# Patient Record
Sex: MALE | Race: BLACK OR AFRICAN AMERICAN | NOT HISPANIC OR LATINO | URBAN - METROPOLITAN AREA
[De-identification: names, ages, dates, MRNs, and addresses within clinical notes are randomized per-mention and may not be internally consistent; named-entity substitution may affect disease eponyms.]

---

## 2022-03-18 ENCOUNTER — HOSPITAL ENCOUNTER (EMERGENCY)
Facility: OTHER | Age: 32
Discharge: PSYCHIATRIC OR SUBSTANCE USE FACILITY | End: 2022-03-18
Attending: EMERGENCY MEDICINE
Payer: MEDICAID

## 2022-03-18 VITALS
RESPIRATION RATE: 17 BRPM | OXYGEN SATURATION: 100 % | HEART RATE: 68 BPM | DIASTOLIC BLOOD PRESSURE: 78 MMHG | SYSTOLIC BLOOD PRESSURE: 129 MMHG | WEIGHT: 125 LBS | TEMPERATURE: 98 F | HEIGHT: 68 IN | BODY MASS INDEX: 18.94 KG/M2

## 2022-03-18 DIAGNOSIS — F23 ACUTE PSYCHOSIS: Primary | ICD-10-CM

## 2022-03-18 LAB
ALBUMIN SERPL BCP-MCNC: 4.6 G/DL (ref 3.5–5.2)
ALP SERPL-CCNC: 86 U/L (ref 55–135)
ALT SERPL W/O P-5'-P-CCNC: 10 U/L (ref 10–44)
AMPHET+METHAMPHET UR QL: NEGATIVE
ANION GAP SERPL CALC-SCNC: 11 MMOL/L (ref 8–16)
APAP SERPL-MCNC: <3 UG/ML (ref 10–20)
AST SERPL-CCNC: 14 U/L (ref 10–40)
BARBITURATES UR QL SCN>200 NG/ML: NEGATIVE
BASOPHILS # BLD AUTO: 0.03 K/UL (ref 0–0.2)
BASOPHILS NFR BLD: 0.7 % (ref 0–1.9)
BENZODIAZ UR QL SCN>200 NG/ML: NEGATIVE
BILIRUB SERPL-MCNC: 0.4 MG/DL (ref 0.1–1)
BILIRUB UR QL STRIP: NEGATIVE
BUN SERPL-MCNC: 10 MG/DL (ref 6–20)
BZE UR QL SCN: NEGATIVE
CALCIUM SERPL-MCNC: 9.7 MG/DL (ref 8.7–10.5)
CANNABINOIDS UR QL SCN: NEGATIVE
CHLORIDE SERPL-SCNC: 104 MMOL/L (ref 95–110)
CLARITY UR: CLEAR
CO2 SERPL-SCNC: 25 MMOL/L (ref 23–29)
COLOR UR: YELLOW
CREAT SERPL-MCNC: 0.9 MG/DL (ref 0.5–1.4)
CREAT UR-MCNC: 34.1 MG/DL (ref 23–375)
CTP QC/QA: YES
DIFFERENTIAL METHOD: ABNORMAL
EOSINOPHIL # BLD AUTO: 0 K/UL (ref 0–0.5)
EOSINOPHIL NFR BLD: 0.7 % (ref 0–8)
ERYTHROCYTE [DISTWIDTH] IN BLOOD BY AUTOMATED COUNT: 13.8 % (ref 11.5–14.5)
EST. GFR  (AFRICAN AMERICAN): >60 ML/MIN/1.73 M^2
EST. GFR  (NON AFRICAN AMERICAN): >60 ML/MIN/1.73 M^2
ETHANOL SERPL-MCNC: <10 MG/DL
GLUCOSE SERPL-MCNC: 106 MG/DL (ref 70–110)
GLUCOSE UR QL STRIP: NEGATIVE
HCT VFR BLD AUTO: 38.8 % (ref 40–54)
HGB BLD-MCNC: 13 G/DL (ref 14–18)
HGB UR QL STRIP: NEGATIVE
IMM GRANULOCYTES # BLD AUTO: 0 K/UL (ref 0–0.04)
IMM GRANULOCYTES NFR BLD AUTO: 0 % (ref 0–0.5)
KETONES UR QL STRIP: NEGATIVE
LEUKOCYTE ESTERASE UR QL STRIP: NEGATIVE
LYMPHOCYTES # BLD AUTO: 1.5 K/UL (ref 1–4.8)
LYMPHOCYTES NFR BLD: 34.4 % (ref 18–48)
MCH RBC QN AUTO: 26.7 PG (ref 27–31)
MCHC RBC AUTO-ENTMCNC: 33.5 G/DL (ref 32–36)
MCV RBC AUTO: 80 FL (ref 82–98)
METHADONE UR QL SCN>300 NG/ML: NEGATIVE
MONOCYTES # BLD AUTO: 0.5 K/UL (ref 0.3–1)
MONOCYTES NFR BLD: 12 % (ref 4–15)
NEUTROPHILS # BLD AUTO: 2.3 K/UL (ref 1.8–7.7)
NEUTROPHILS NFR BLD: 52.2 % (ref 38–73)
NITRITE UR QL STRIP: NEGATIVE
NRBC BLD-RTO: 0 /100 WBC
OPIATES UR QL SCN: NEGATIVE
PCP UR QL SCN>25 NG/ML: NEGATIVE
PH UR STRIP: 7 [PH] (ref 5–8)
PLATELET # BLD AUTO: 168 K/UL (ref 150–450)
PMV BLD AUTO: 10 FL (ref 9.2–12.9)
POTASSIUM SERPL-SCNC: 3.7 MMOL/L (ref 3.5–5.1)
PROT SERPL-MCNC: 8.1 G/DL (ref 6–8.4)
PROT UR QL STRIP: NEGATIVE
RBC # BLD AUTO: 4.86 M/UL (ref 4.6–6.2)
SARS-COV-2 RDRP RESP QL NAA+PROBE: NEGATIVE
SODIUM SERPL-SCNC: 140 MMOL/L (ref 136–145)
SP GR UR STRIP: 1.01 (ref 1–1.03)
TOXICOLOGY INFORMATION: NORMAL
TSH SERPL DL<=0.005 MIU/L-ACNC: 1.27 UIU/ML (ref 0.4–4)
URN SPEC COLLECT METH UR: NORMAL
UROBILINOGEN UR STRIP-ACNC: NEGATIVE EU/DL
WBC # BLD AUTO: 4.42 K/UL (ref 3.9–12.7)

## 2022-03-18 PROCEDURE — U0002 COVID-19 LAB TEST NON-CDC: HCPCS | Performed by: EMERGENCY MEDICINE

## 2022-03-18 PROCEDURE — 82077 ASSAY SPEC XCP UR&BREATH IA: CPT | Performed by: EMERGENCY MEDICINE

## 2022-03-18 PROCEDURE — 80307 DRUG TEST PRSMV CHEM ANLYZR: CPT | Performed by: EMERGENCY MEDICINE

## 2022-03-18 PROCEDURE — 81003 URINALYSIS AUTO W/O SCOPE: CPT | Mod: 59 | Performed by: EMERGENCY MEDICINE

## 2022-03-18 PROCEDURE — 80053 COMPREHEN METABOLIC PANEL: CPT | Performed by: EMERGENCY MEDICINE

## 2022-03-18 PROCEDURE — 84443 ASSAY THYROID STIM HORMONE: CPT | Performed by: EMERGENCY MEDICINE

## 2022-03-18 PROCEDURE — 85025 COMPLETE CBC W/AUTO DIFF WBC: CPT | Performed by: EMERGENCY MEDICINE

## 2022-03-18 PROCEDURE — 99285 EMERGENCY DEPT VISIT HI MDM: CPT | Mod: 25

## 2022-03-18 PROCEDURE — 80143 DRUG ASSAY ACETAMINOPHEN: CPT | Performed by: EMERGENCY MEDICINE

## 2022-03-18 NOTE — ED NOTES
Pt belongings include: jacket, sandals, sweatshirt, shorts, [employer], pen, chap stick, bead necklace    Pt valuables include: cell phone, aripiprazole 10mg 28 tabs, depakote 10 tabs    Security arrived to take valuables and belongings.  Ticket # [id number]

## 2022-03-18 NOTE — ED TRIAGE NOTES
"Pt brought to the ED via [facility] c/o mental health problem. Pt voluntarily called [facility] to bring him to the ED to seek out mental help. Pt reports "hearing demons" and "seeing stars all around him." Denies SI but reports HI towards "people who want to hurt me." Pt repeatedly asking for his . Denies any other complaints at this time. AAOx4  "
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
## 2022-05-25 ENCOUNTER — HOSPITAL ENCOUNTER (OUTPATIENT)
Facility: OTHER | Age: 32
Discharge: HOME | End: 2022-05-26
Attending: EMERGENCY MEDICINE | Admitting: EMERGENCY MEDICINE
Payer: MEDICAID

## 2022-05-25 DIAGNOSIS — M62.82 NON-TRAUMATIC RHABDOMYOLYSIS: Primary | ICD-10-CM

## 2022-05-25 DIAGNOSIS — R10.9 ACUTE ABDOMINAL PAIN: ICD-10-CM

## 2022-05-25 LAB
ALBUMIN SERPL BCP-MCNC: 4.7 G/DL (ref 3.5–5.2)
ALP SERPL-CCNC: 92 U/L (ref 55–135)
ALT SERPL W/O P-5'-P-CCNC: 36 U/L (ref 10–44)
ANION GAP SERPL CALC-SCNC: 15 MMOL/L (ref 8–16)
AST SERPL-CCNC: 72 U/L (ref 10–40)
BASOPHILS # BLD AUTO: 0.04 K/UL (ref 0–0.2)
BASOPHILS NFR BLD: 0.3 % (ref 0–1.9)
BILIRUB SERPL-MCNC: 0.4 MG/DL (ref 0.1–1)
BILIRUB UR QL STRIP: NEGATIVE
BUN SERPL-MCNC: 13 MG/DL (ref 6–20)
CALCIUM SERPL-MCNC: 10 MG/DL (ref 8.7–10.5)
CHLORIDE SERPL-SCNC: 106 MMOL/L (ref 95–110)
CK SERPL-CCNC: 2430 U/L (ref 20–200)
CLARITY UR: CLEAR
CO2 SERPL-SCNC: 22 MMOL/L (ref 23–29)
COLOR UR: YELLOW
CREAT SERPL-MCNC: 1 MG/DL (ref 0.5–1.4)
CTP QC/QA: YES
DIFFERENTIAL METHOD: ABNORMAL
EOSINOPHIL # BLD AUTO: 0 K/UL (ref 0–0.5)
EOSINOPHIL NFR BLD: 0.2 % (ref 0–8)
ERYTHROCYTE [DISTWIDTH] IN BLOOD BY AUTOMATED COUNT: 13.5 % (ref 11.5–14.5)
EST. GFR  (AFRICAN AMERICAN): >60 ML/MIN/1.73 M^2
EST. GFR  (NON AFRICAN AMERICAN): >60 ML/MIN/1.73 M^2
GLUCOSE SERPL-MCNC: 71 MG/DL (ref 70–110)
GLUCOSE UR QL STRIP: NEGATIVE
HCT VFR BLD AUTO: 46.6 % (ref 40–54)
HGB BLD-MCNC: 15.5 G/DL (ref 14–18)
HGB UR QL STRIP: NEGATIVE
IMM GRANULOCYTES # BLD AUTO: 0.04 K/UL (ref 0–0.04)
IMM GRANULOCYTES NFR BLD AUTO: 0.3 % (ref 0–0.5)
KETONES UR QL STRIP: NEGATIVE
LEUKOCYTE ESTERASE UR QL STRIP: NEGATIVE
LIPASE SERPL-CCNC: 110 U/L (ref 4–60)
LYMPHOCYTES # BLD AUTO: 1.6 K/UL (ref 1–4.8)
LYMPHOCYTES NFR BLD: 12.3 % (ref 18–48)
MCH RBC QN AUTO: 28 PG (ref 27–31)
MCHC RBC AUTO-ENTMCNC: 33.3 G/DL (ref 32–36)
MCV RBC AUTO: 84 FL (ref 82–98)
MONOCYTES # BLD AUTO: 0.9 K/UL (ref 0.3–1)
MONOCYTES NFR BLD: 6.7 % (ref 4–15)
NEUTROPHILS # BLD AUTO: 10.2 K/UL (ref 1.8–7.7)
NEUTROPHILS NFR BLD: 80.2 % (ref 38–73)
NITRITE UR QL STRIP: NEGATIVE
NRBC BLD-RTO: 0 /100 WBC
PH UR STRIP: 7 [PH] (ref 5–8)
PLATELET # BLD AUTO: 177 K/UL (ref 150–450)
PMV BLD AUTO: 10.3 FL (ref 9.2–12.9)
POTASSIUM SERPL-SCNC: 5 MMOL/L (ref 3.5–5.1)
PROT SERPL-MCNC: 8.2 G/DL (ref 6–8.4)
PROT UR QL STRIP: NEGATIVE
RBC # BLD AUTO: 5.53 M/UL (ref 4.6–6.2)
SARS-COV-2 RDRP RESP QL NAA+PROBE: NEGATIVE
SODIUM SERPL-SCNC: 143 MMOL/L (ref 136–145)
SP GR UR STRIP: 1.02 (ref 1–1.03)
URN SPEC COLLECT METH UR: NORMAL
UROBILINOGEN UR STRIP-ACNC: 1 EU/DL
WBC # BLD AUTO: 12.67 K/UL (ref 3.9–12.7)

## 2022-05-25 PROCEDURE — 96375 TX/PRO/DX INJ NEW DRUG ADDON: CPT

## 2022-05-25 PROCEDURE — G0378 HOSPITAL OBSERVATION PER HR: HCPCS

## 2022-05-25 PROCEDURE — 80053 COMPREHEN METABOLIC PANEL: CPT | Performed by: EMERGENCY MEDICINE

## 2022-05-25 PROCEDURE — 25000003 PHARM REV CODE 250: Performed by: NURSE PRACTITIONER

## 2022-05-25 PROCEDURE — 25000003 PHARM REV CODE 250: Performed by: EMERGENCY MEDICINE

## 2022-05-25 PROCEDURE — 96374 THER/PROPH/DIAG INJ IV PUSH: CPT

## 2022-05-25 PROCEDURE — 81003 URINALYSIS AUTO W/O SCOPE: CPT | Performed by: EMERGENCY MEDICINE

## 2022-05-25 PROCEDURE — 82550 ASSAY OF CK (CPK): CPT | Performed by: EMERGENCY MEDICINE

## 2022-05-25 PROCEDURE — U0002 COVID-19 LAB TEST NON-CDC: HCPCS | Performed by: EMERGENCY MEDICINE

## 2022-05-25 PROCEDURE — 99285 EMERGENCY DEPT VISIT HI MDM: CPT | Mod: 25

## 2022-05-25 PROCEDURE — 63600175 PHARM REV CODE 636 W HCPCS: Performed by: EMERGENCY MEDICINE

## 2022-05-25 PROCEDURE — 85025 COMPLETE CBC W/AUTO DIFF WBC: CPT | Performed by: EMERGENCY MEDICINE

## 2022-05-25 PROCEDURE — 96361 HYDRATE IV INFUSION ADD-ON: CPT

## 2022-05-25 PROCEDURE — 83690 ASSAY OF LIPASE: CPT | Performed by: EMERGENCY MEDICINE

## 2022-05-25 RX ORDER — MORPHINE SULFATE 4 MG/ML
4 INJECTION, SOLUTION INTRAMUSCULAR; INTRAVENOUS EVERY 4 HOURS PRN
Status: DISCONTINUED | OUTPATIENT
Start: 2022-05-25 | End: 2022-05-26 | Stop reason: HOSPADM

## 2022-05-25 RX ORDER — ONDANSETRON 2 MG/ML
4 INJECTION INTRAMUSCULAR; INTRAVENOUS
Status: COMPLETED | OUTPATIENT
Start: 2022-05-25 | End: 2022-05-25

## 2022-05-25 RX ORDER — SODIUM CHLORIDE 0.9 % (FLUSH) 0.9 %
10 SYRINGE (ML) INJECTION
Status: DISCONTINUED | OUTPATIENT
Start: 2022-05-25 | End: 2022-05-26 | Stop reason: HOSPADM

## 2022-05-25 RX ORDER — ONDANSETRON 2 MG/ML
4 INJECTION INTRAMUSCULAR; INTRAVENOUS EVERY 8 HOURS PRN
Status: DISCONTINUED | OUTPATIENT
Start: 2022-05-25 | End: 2022-05-26 | Stop reason: HOSPADM

## 2022-05-25 RX ORDER — HYDROMORPHONE HYDROCHLORIDE 1 MG/ML
0.5 INJECTION, SOLUTION INTRAMUSCULAR; INTRAVENOUS; SUBCUTANEOUS
Status: COMPLETED | OUTPATIENT
Start: 2022-05-25 | End: 2022-05-25

## 2022-05-25 RX ORDER — PROMETHAZINE HYDROCHLORIDE 25 MG/1
25 TABLET ORAL EVERY 6 HOURS PRN
Status: DISCONTINUED | OUTPATIENT
Start: 2022-05-25 | End: 2022-05-26 | Stop reason: HOSPADM

## 2022-05-25 RX ORDER — TALC
6 POWDER (GRAM) TOPICAL NIGHTLY PRN
Status: DISCONTINUED | OUTPATIENT
Start: 2022-05-25 | End: 2022-05-26 | Stop reason: HOSPADM

## 2022-05-25 RX ORDER — ACETAMINOPHEN 325 MG/1
650 TABLET ORAL EVERY 8 HOURS PRN
Status: DISCONTINUED | OUTPATIENT
Start: 2022-05-25 | End: 2022-05-26 | Stop reason: HOSPADM

## 2022-05-25 RX ORDER — HYDROCODONE BITARTRATE AND ACETAMINOPHEN 5; 325 MG/1; MG/1
1 TABLET ORAL EVERY 4 HOURS PRN
Status: DISCONTINUED | OUTPATIENT
Start: 2022-05-25 | End: 2022-05-26 | Stop reason: HOSPADM

## 2022-05-25 RX ORDER — SODIUM CHLORIDE 9 MG/ML
1000 INJECTION, SOLUTION INTRAVENOUS
Status: COMPLETED | OUTPATIENT
Start: 2022-05-25 | End: 2022-05-26

## 2022-05-25 RX ADMIN — HYDROMORPHONE HYDROCHLORIDE 0.5 MG: 1 INJECTION, SOLUTION INTRAMUSCULAR; INTRAVENOUS; SUBCUTANEOUS at 09:05

## 2022-05-25 RX ADMIN — SODIUM CHLORIDE 1000 ML: 0.9 INJECTION, SOLUTION INTRAVENOUS at 10:05

## 2022-05-25 RX ADMIN — SODIUM CHLORIDE 1000 ML: 0.9 INJECTION, SOLUTION INTRAVENOUS at 09:05

## 2022-05-25 RX ADMIN — ONDANSETRON 4 MG: 2 INJECTION INTRAMUSCULAR; INTRAVENOUS at 09:05

## 2022-05-25 NOTE — Clinical Note
"Eliseo Dowellkalyan Mccrary was seen and treated in our emergency department on 5/25/2022.  He may return to work on 06/02/2022.       If you have any questions or concerns, please don't hesitate to call.      GERHARD Acosta"

## 2022-05-25 NOTE — Clinical Note
Diagnosis: Acute abdominal pain [[id number]]   Future Attending Provider: ELSA FNUK [[id number]]   Is the patient being sent to ED Observation?: Yes   Admitting Provider:: ELSA FUNK [[id number]]

## 2022-05-26 VITALS
BODY MASS INDEX: 19.4 KG/M2 | HEIGHT: 68 IN | WEIGHT: 128 LBS | OXYGEN SATURATION: 98 % | RESPIRATION RATE: 18 BRPM | SYSTOLIC BLOOD PRESSURE: 103 MMHG | DIASTOLIC BLOOD PRESSURE: 60 MMHG | TEMPERATURE: 98 F | HEART RATE: 60 BPM

## 2022-05-26 PROBLEM — R10.9 ACUTE ABDOMINAL PAIN: Status: ACTIVE | Noted: 2022-05-26

## 2022-05-26 LAB
ALBUMIN SERPL BCP-MCNC: 3.3 G/DL (ref 3.5–5.2)
ALP SERPL-CCNC: 50 U/L (ref 55–135)
ALT SERPL W/O P-5'-P-CCNC: 25 U/L (ref 10–44)
ANION GAP SERPL CALC-SCNC: 7 MMOL/L (ref 8–16)
AST SERPL-CCNC: 50 U/L (ref 10–40)
BASOPHILS # BLD AUTO: 0.03 K/UL (ref 0–0.2)
BASOPHILS NFR BLD: 0.5 % (ref 0–1.9)
BILIRUB SERPL-MCNC: 0.5 MG/DL (ref 0.1–1)
BUN SERPL-MCNC: 11 MG/DL (ref 6–20)
CALCIUM SERPL-MCNC: 8.3 MG/DL (ref 8.7–10.5)
CHLORIDE SERPL-SCNC: 111 MMOL/L (ref 95–110)
CK SERPL-CCNC: 1660 U/L (ref 20–200)
CO2 SERPL-SCNC: 24 MMOL/L (ref 23–29)
CREAT SERPL-MCNC: 0.7 MG/DL (ref 0.5–1.4)
DIFFERENTIAL METHOD: ABNORMAL
EOSINOPHIL # BLD AUTO: 0.1 K/UL (ref 0–0.5)
EOSINOPHIL NFR BLD: 0.9 % (ref 0–8)
ERYTHROCYTE [DISTWIDTH] IN BLOOD BY AUTOMATED COUNT: 13.6 % (ref 11.5–14.5)
EST. GFR  (AFRICAN AMERICAN): >60 ML/MIN/1.73 M^2
EST. GFR  (NON AFRICAN AMERICAN): >60 ML/MIN/1.73 M^2
GLUCOSE SERPL-MCNC: 73 MG/DL (ref 70–110)
HCT VFR BLD AUTO: 38.3 % (ref 40–54)
HGB BLD-MCNC: 12.6 G/DL (ref 14–18)
IMM GRANULOCYTES # BLD AUTO: 0.02 K/UL (ref 0–0.04)
IMM GRANULOCYTES NFR BLD AUTO: 0.3 % (ref 0–0.5)
LYMPHOCYTES # BLD AUTO: 2.3 K/UL (ref 1–4.8)
LYMPHOCYTES NFR BLD: 35.2 % (ref 18–48)
MCH RBC QN AUTO: 27.3 PG (ref 27–31)
MCHC RBC AUTO-ENTMCNC: 32.9 G/DL (ref 32–36)
MCV RBC AUTO: 83 FL (ref 82–98)
MONOCYTES # BLD AUTO: 0.5 K/UL (ref 0.3–1)
MONOCYTES NFR BLD: 8.2 % (ref 4–15)
NEUTROPHILS # BLD AUTO: 3.6 K/UL (ref 1.8–7.7)
NEUTROPHILS NFR BLD: 54.9 % (ref 38–73)
NRBC BLD-RTO: 0 /100 WBC
PLATELET # BLD AUTO: 154 K/UL (ref 150–450)
PMV BLD AUTO: 10.2 FL (ref 9.2–12.9)
POTASSIUM SERPL-SCNC: 4.3 MMOL/L (ref 3.5–5.1)
PROT SERPL-MCNC: 5.7 G/DL (ref 6–8.4)
RBC # BLD AUTO: 4.61 M/UL (ref 4.6–6.2)
SODIUM SERPL-SCNC: 142 MMOL/L (ref 136–145)
WBC # BLD AUTO: 6.6 K/UL (ref 3.9–12.7)

## 2022-05-26 PROCEDURE — 82550 ASSAY OF CK (CPK): CPT | Performed by: NURSE PRACTITIONER

## 2022-05-26 PROCEDURE — 80053 COMPREHEN METABOLIC PANEL: CPT | Performed by: NURSE PRACTITIONER

## 2022-05-26 PROCEDURE — G0378 HOSPITAL OBSERVATION PER HR: HCPCS

## 2022-05-26 PROCEDURE — 96361 HYDRATE IV INFUSION ADD-ON: CPT

## 2022-05-26 PROCEDURE — 25000003 PHARM REV CODE 250: Performed by: NURSE PRACTITIONER

## 2022-05-26 PROCEDURE — 85025 COMPLETE CBC W/AUTO DIFF WBC: CPT | Performed by: NURSE PRACTITIONER

## 2022-05-26 RX ORDER — CETIRIZINE HYDROCHLORIDE 10 MG/1
10 TABLET ORAL DAILY
COMMUNITY

## 2022-05-26 RX ORDER — ARIPIPRAZOLE 10 MG/1
5 TABLET ORAL DAILY
COMMUNITY

## 2022-05-26 RX ORDER — OMEPRAZOLE 20 MG/1
20 CAPSULE, DELAYED RELEASE ORAL DAILY
COMMUNITY
End: 2022-05-26

## 2022-05-26 RX ORDER — SUCRALFATE 1 G/1
1 TABLET ORAL 2 TIMES DAILY
Qty: 30 TABLET | Refills: 0 | Status: SHIPPED | OUTPATIENT
Start: 2022-05-26

## 2022-05-26 RX ORDER — ACETAMINOPHEN 325 MG/1
650 TABLET ORAL EVERY 6 HOURS PRN
Qty: 20 TABLET | Refills: 0 | Status: SHIPPED | OUTPATIENT
Start: 2022-05-26

## 2022-05-26 RX ADMIN — SODIUM CHLORIDE 1000 ML: 0.9 INJECTION, SOLUTION INTRAVENOUS at 04:05

## 2022-05-26 RX ADMIN — HYDROCODONE BITARTRATE AND ACETAMINOPHEN 1 TABLET: 5; 325 TABLET ORAL at 09:05

## 2022-05-26 NOTE — ED PROVIDER NOTES
Encounter Date: 5/25/2022    SCRIBE #1 NOTE: I, Sita Kellogg, am scribing for, and in the presence of,  Reinaldo Roman II, MD. I have scribed the following portions of the note - Other sections scribed: HPI, ROS, PE.       History     Chief Complaint   Patient presents with    Abdominal Pain     Patient reports mid lower abd stabbing pain, onset 1600 today. Denies any N/V/D - last BM today at 1630 (reg, formed). Also, right knee cap abrasion during soccer game today, bleeding controlled     Eliseo Mccrary is a 31 y.o. male, with a PMHx of schizophrenia, who presents to the ED for evaluation of abdomianl pain. Pt reports acute, severe, sharp and stabbing mid-lower abdominal pain onset earlier today. Pt reports onset of symptoms at 1600 before a soccer game. States improvement of pain during the game, but reports the pain has returned and since worsened after the game. Also endorses associated symptoms of nausea, but denies vomiting. Pt reports hx of Covid-19 1 year ago and denies hx of previous similar symptoms or hx of abdominal surgeries. He reports his last normal bowel movement at 1600 today without diarrhea, constipation, or blood in stool. Pt denies tobacco, ETOH, or other illicit drug use. Denies vomiting, other somatic symptoms, and pain elsewhere. No other modifying or alleviating factors.     The history is provided by the patient.     Review of patient's allergies indicates:   Allergen Reactions    Pcn [penicillins] Itching     History reviewed. No pertinent past medical history.  History reviewed. No pertinent surgical history.  History reviewed. No pertinent family history.     Review of Systems   Constitutional: Negative for chills, diaphoresis and fever.   HENT: Negative for ear pain and sore throat.    Eyes: Negative for pain.   Respiratory: Negative for cough and shortness of breath.    Cardiovascular: Negative for chest pain.   Gastrointestinal: Positive for abdominal pain (Mid-lower) and  nausea. Negative for constipation, diarrhea and vomiting.   Genitourinary: Negative for dysuria.   Musculoskeletal: Negative for back pain.   Skin: Negative for rash.   Neurological: Negative for headaches.   Psychiatric/Behavioral: Negative for confusion.       Physical Exam     Initial Vitals   BP Pulse Resp Temp SpO2   05/25/22 1930 05/25/22 1928 05/25/22 1928 05/25/22 1928 05/25/22 1928   (!) 165/89 87 16 98.5 °F (36.9 °C) 99 %      MAP       --                Physical Exam    Nursing note and vitals reviewed.  Constitutional: No distress.   Shivering, and soaking wet from rain.    HENT:   Head: Normocephalic and atraumatic.   Dry mucous membranes.   Eyes: Conjunctivae are normal.   Neck: Neck supple.   Cardiovascular: Normal rate and regular rhythm.   Pulmonary/Chest: Breath sounds normal. No respiratory distress.   Abdominal: Abdomen is soft. There is abdominal tenderness (periumbilical). There is no rebound and no guarding.   Musculoskeletal:         General: No edema.      Cervical back: Neck supple.     Neurological: He is alert and oriented to person, place, and time.   Skin: Skin is warm and dry.   Extremities are cold to touch, with diminished capillary refill, but centrally warm.    Psychiatric: He has a normal mood and affect.         ED Course   Procedures  Labs Reviewed   CBC W/ AUTO DIFFERENTIAL - Abnormal; Notable for the following components:       Result Value    Gran # (ANC) 10.2 (*)     Gran % 80.2 (*)     Lymph % 12.3 (*)     All other components within normal limits   COMPREHENSIVE METABOLIC PANEL - Abnormal; Notable for the following components:    CO2 22 (*)     AST 72 (*)     All other components within normal limits   LIPASE - Abnormal; Notable for the following components:    Lipase 110 (*)     All other components within normal limits   CK - Abnormal; Notable for the following components:    CPK 2430 (*)     All other components within normal limits   URINALYSIS, REFLEX TO URINE CULTURE     Narrative:     Specimen Source->Urine   SARS-COV-2 RDRP GENE          Imaging Results          X-Ray Abdomen Flat And Erect (Final result)  Result time 05/25/22 21:44:17    Final result by Oma Farooq MD (05/25/22 21:44:17)                 Impression:      Nonspecific bowel gas pattern.      Electronically signed by: Oma Farooq  Date:    05/25/2022  Time:    21:44             Narrative:    EXAMINATION:  ABDOMEN FLAT AND ERECT    CLINICAL HISTORY:  abd pain;    TECHNIQUE:  Abdomen flat and erect radiographs were submitted.    COMPARISON:  None.    FINDINGS:  Abdomen flat and erect radiographs demonstrate a nonspecific bowel gas pattern.  There are no dilated loops of small bowel or air-fluid levels detected.  There is no free air detected under the diaphragm.  Mild levoscoliosis is present.                              X-Rays:   Independently Interpreted Readings:   Abdomen:   Flat and Erect of Abdomen - NO air fluid levels or free air. No ileus or obstruction.      Medications   sodium chloride 0.9% flush 10 mL (has no administration in time range)   melatonin tablet 6 mg (has no administration in time range)   acetaminophen tablet 650 mg (has no administration in time range)   HYDROcodone-acetaminophen 5-325 mg per tablet 1 tablet (has no administration in time range)   morphine injection 4 mg (has no administration in time range)   ondansetron injection 4 mg (has no administration in time range)   promethazine tablet 25 mg (has no administration in time range)   0.9%  NaCl infusion (1,000 mLs Intravenous Not Given 5/25/22 2235)   sodium chloride 0.9% bolus 1,000 mL (0 mLs Intravenous Stopped 5/25/22 2208)   HYDROmorphone injection 0.5 mg (0.5 mg Intravenous Given 5/25/22 2103)   ondansetron injection 4 mg (4 mg Intravenous Given 5/25/22 2104)   0.9%  NaCl infusion (1,000 mLs Intravenous New Bag 5/25/22 2214)     Medical Decision Making:   History:   Old Medical Records: I decided to obtain old  medical records.  Independently Interpreted Test(s):   I have ordered and independently interpreted X-rays - see prior notes.  Clinical Tests:   Lab Tests: Ordered and Reviewed  Radiological Study: Ordered and Reviewed  Patient presents complaining of abdominal pain, which started earlier today.  He then played soccer in the pouring rain despite the pain and now presents with persistent periumbilical pain.  He also is very cold, shivering.  Changed out of his wet clothes placed in dry gown, warm blankets and this did improve.  He had dry mucous membranes, appeared mildly dehydrated, IV fluid repletion begun.  A given antiemetics and analgesics the abdominal pain.  His laboratory studies show normal white count, electrolytes and renal function be does have an elevated CPK approximately 2500.  Urinalysis is clear.  Upon repeat examination abdominal pain has resolved, nontender to palpation.  Due to the rhabdo, will admit to our observation unit for further hydration and serial CPK to ensure that is not trending upwards.        Scribe Attestation:   Scribe #1: I performed the above scribed service and the documentation accurately describes the services I performed. I attest to the accuracy of the note.               I, [facility], personally performed the services described in this documentation. All medical record entries made by the scribe were at my direction and in my presence. I have reviewed the chart and agree that the record reflects my personal performance and is accurate and complete.  Clinical Impression:   Final diagnoses:  [R10.9] Acute abdominal pain (Primary)  [M62.82] Non-traumatic rhabdomyolysis          ED Disposition Condition    Observation               Reinaldo Roman II, MD  05/26/22 0044

## 2022-05-26 NOTE — ED TRIAGE NOTES
Pt presents to the ED c/o abdominal pain. Pt reports the pain began approx 16:30 earlier today and has gotten worse since. The pain is in the periumbilical area with 2 tender nodules noted bilateral umbilicus. Denies any n/v/d. Denies any other complaints at this time. AAOx4

## 2022-05-26 NOTE — H&P
ED Observation Unit  History and Physical      I assumed care of this patient from the Main ED at onset of observation time, 2300 on 05/25/2022.       History of Present Illness:    Eliseo Mccrary is a 31 y.o. male, with a PMHx of schizophrenia, who presents to the ED for evaluation of abdomianl pain. Pt reports acute, severe, sharp and stabbing mid-lower abdominal pain onset earlier today. Pt reports onset of symptoms at 1600 before a soccer game. States improvement of pain during the game, but reports the pain has returned and since worsened after the game. Also endorses associated symptoms of nausea, but denies vomiting. Pt reports hx of Covid-19 1 year ago and denies hx of previous similar symptoms or hx of abdominal surgeries. He reports his last normal bowel movement at 1600 today without diarrhea, constipation, or blood in stool. Pt denies tobacco, ETOH, or other illicit drug use. Denies vomiting, other somatic symptoms, and pain elsewhere. No other modifying or alleviating factors.     ED Course:  Workup significant for mildly elevated lipase, but not 3 times the upper limit.  X-ray with normal bowel gas pattern.  CK elevated at 2430. Patient reports improvement in symptoms after IV fluids and 0.5 mg of Dilaudid.    Plan for EDOU observation with IV fluids, repeat labs in the morning, and serial abdominal exams.    I reviewed the ED Provider Note prior to my evaluation of this patient.  I reviewed all labs and imaging performed in the Main ED.  PMHx   History reviewed. No pertinent past medical history.   History reviewed. No pertinent surgical history.     Family Hx   History reviewed. No pertinent family history.     Social Hx   Social History     Socioeconomic History    Marital status: Single        Vital Signs   Vitals:    05/25/22 1928 05/25/22 1930 05/25/22 2103 05/25/22 2250   BP:  (!) 165/89  (!) 102/51   Patient Position:    Sitting   Pulse: 87   (!) 56   Resp: 16  16 16   Temp: 98.5 °F (36.9  "°C)   97.8 °F (36.6 °C)   TempSrc: Oral   Oral   SpO2: 99%   97%   Weight: 58.1 kg (128 lb)      Height: 5' 8" (1.727 m)           Review of Systems  Review of Systems   Constitutional: Negative for fever.   HENT: Negative for congestion.    Respiratory: Negative for cough and shortness of breath.    Cardiovascular: Negative for chest pain.   Gastrointestinal: Positive for abdominal pain (Improving).   Genitourinary: Negative for dysuria.   Musculoskeletal: Negative for joint pain and myalgias.   Skin: Negative for rash.   Neurological: Negative for weakness and headaches.   Psychiatric/Behavioral: Negative.        Physical Exam  Physical Exam  Constitutional:       General: He is not in acute distress.  HENT:      Head: Normocephalic and atraumatic.   Cardiovascular:      Rate and Rhythm: Normal rate and regular rhythm.      Heart sounds: Normal heart sounds.   Pulmonary:      Effort: Pulmonary effort is normal.      Breath sounds: Normal breath sounds.   Abdominal:      General: Abdomen is flat.      Palpations: Abdomen is soft.      Tenderness: There is generalized abdominal tenderness (Mild).   Musculoskeletal:         General: Normal range of motion.   Skin:     General: Skin is warm and dry.   Neurological:      General: No focal deficit present.      Mental Status: He is alert.   Psychiatric:         Mood and Affect: Mood normal.         Behavior: Behavior normal.         Medications:   Scheduled Meds:   sodium chloride 0.9%  1,000 mL Intravenous ED 1 Time     Continuous Infusions:  PRN Meds:.acetaminophen, HYDROcodone-acetaminophen, melatonin, morphine, ondansetron, promethazine, sodium chloride 0.9%      Assessment/Plan:  1. Elevated CK  2. Abdominal pain    -IV fluids  -repeat labs in the AM  -serial abdominal exams    Case was discussed with the ED provider.               "

## 2022-05-26 NOTE — PROGRESS NOTES
ED Observation Unit  Progress Note      HPI   Eliseo Mccrary is a 31 y.o. male, with a PMHx of schizophrenia, who presents to the ED for evaluation of abdomianl pain. Pt reports acute, severe, sharp and stabbing mid-lower abdominal pain onset earlier today. Pt reports onset of symptoms at 1600 before a soccer game. States improvement of pain during the game, but reports the pain has returned and since worsened after the game. Also endorses associated symptoms of nausea, but denies vomiting. Pt reports hx of Covid-19 1 year ago and denies hx of previous similar symptoms or hx of abdominal surgeries. He reports his last normal bowel movement at 1600 today without diarrhea, constipation, or blood in stool. Pt denies tobacco, ETOH, or other illicit drug use. Denies vomiting, other somatic symptoms, and pain elsewhere. No other modifying or alleviating factors.      ED Course:  Workup significant for mildly elevated lipase, but not 3 times the upper limit.  X-ray with normal bowel gas pattern.  CK elevated at 2430. Patient reports improvement in symptoms after IV fluids and 0.5 mg of Dilaudid.     Plan for EDOU observation with IV fluids, repeat labs in the morning, and serial abdominal exams.    Interval History   Patient reports generalized pain continues to improve.  Continue reports no abdominal pain.  Continues with no electrolyte abnormalities.  No signs of kidney injury.  CPK did improve    PMHx   History reviewed. No pertinent past medical history.   History reviewed. No pertinent surgical history.     Family Hx   History reviewed. No pertinent family history.     Social Hx   Social History     Socioeconomic History    Marital status: Single        Vital Signs   Vitals:    05/26/22 0420 05/26/22 0836 05/26/22 0929 05/26/22 1216   BP: (!) 109/53 (!) 98/54  103/60   BP Location:  Left arm  Left arm   Patient Position:  Lying  Sitting   Pulse: (!) 54 (!) 57  60   Resp: 18 18 18 18   Temp: 97.5 °F (36.4 °C)  97.4 °F (36.3 °C)  97.5 °F (36.4 °C)   TempSrc:  Oral  Oral   SpO2: 98% 98%  98%   Weight:       Height:            Review of Systems  Review of Systems   Constitutional: Negative for fever.   HENT: Negative for congestion.    Respiratory: Negative for cough and shortness of breath.    Cardiovascular: Negative for chest pain.   Gastrointestinal: Positive for abdominal pain (Improved)  Genitourinary: Negative for dysuria.   Musculoskeletal: Negative for joint pain and myalgias.   Skin: Negative for rash.   Neurological: Negative for weakness and headaches.   Psychiatric/Behavioral: Negative.        Brief Physical Exam/Reassessment   Constitutional:       General: He is not in acute distress.  Patient was sleeping upon entering the room  HENT:      Head: Normocephalic and atraumatic.   Cardiovascular:      Rate and Rhythm: Normal rate and regular rhythm.      Heart sounds: Normal heart sounds.   Pulmonary:      Effort: Pulmonary effort is normal.      Breath sounds: Normal breath sounds.   Abdominal:      General: Abdomen is flat.      Palpations: Abdomen is soft.      Tenderness: There is no pain on exam  Musculoskeletal:         General: Normal range of motion.   Skin:     General: Skin is warm and dry.   Neurological:      General: No focal deficit present.      Mental Status: He is alert.   Psychiatric:         Mood and Affect: Mood normal.         Behavior: Behavior normal.       Labs/Imaging   Labs Reviewed   CBC W/ AUTO DIFFERENTIAL - Abnormal; Notable for the following components:       Result Value    Gran # (ANC) 10.2 (*)     Gran % 80.2 (*)     Lymph % 12.3 (*)     All other components within normal limits   COMPREHENSIVE METABOLIC PANEL - Abnormal; Notable for the following components:    CO2 22 (*)     AST 72 (*)     All other components within normal limits   LIPASE - Abnormal; Notable for the following components:    Lipase 110 (*)     All other components within normal limits   CK - Abnormal; Notable  for the following components:    CPK 2430 (*)     All other components within normal limits   CBC W/ AUTO DIFFERENTIAL - Abnormal; Notable for the following components:    Hemoglobin 12.6 (*)     Hematocrit 38.3 (*)     All other components within normal limits   COMPREHENSIVE METABOLIC PANEL - Abnormal; Notable for the following components:    Chloride 111 (*)     Calcium 8.3 (*)     Total Protein 5.7 (*)     Albumin 3.3 (*)     Alkaline Phosphatase 50 (*)     AST 50 (*)     Anion Gap 7 (*)     All other components within normal limits   CK - Abnormal; Notable for the following components:    CPK 1660 (*)     All other components within normal limits   URINALYSIS, REFLEX TO URINE CULTURE    Narrative:     Specimen Source->Urine   SARS-COV-2 RDRP GENE      Imaging Results          X-Ray Abdomen Flat And Erect (Final result)  Result time 05/25/22 21:44:17    Final result by Oma Farooq MD (05/25/22 21:44:17)                 Impression:      Nonspecific bowel gas pattern.      Electronically signed by: Oma Farooq  Date:    05/25/2022  Time:    21:44             Narrative:    EXAMINATION:  ABDOMEN FLAT AND ERECT    CLINICAL HISTORY:  abd pain;    TECHNIQUE:  Abdomen flat and erect radiographs were submitted.    COMPARISON:  None.    FINDINGS:  Abdomen flat and erect radiographs demonstrate a nonspecific bowel gas pattern.  There are no dilated loops of small bowel or air-fluid levels detected.  There is no free air detected under the diaphragm.  Mild levoscoliosis is present.                                 I reviewed all labs, imaging, EKGs.     Plan   1. Acute abdominal pain    2. Non-traumatic rhabdomyolysis      1. Acute abdominal pain-resolved prior to arrival no recurrence.  Patient reports that he is following with his doctor for possible GERD.  Lipase is mildly elevated could be related to medication verses some ketosis.  No focal tenderness on exam.  Plan to send outpatient referral.  Will also DC  omeprazole as  potential contributing agent to elevated lipase    2. Nontraumatic rhabdomyolysis-CPK improving.  Overall pain improving.  Tolerating oral liquids.  Plan to DC home with strict instructions on no exertional or continued physical activity for the next several days.    I have discussed this case with ED provider

## 2022-05-26 NOTE — DISCHARGE SUMMARY
ED Observation Unit  Discharge Summary        History of Present Illness:    Eliseo Mccrary is a 31 y.o. male, with a PMHx of schizophrenia, who presents to the ED for evaluation of abdomianl pain. Pt reports acute, severe, sharp and stabbing mid-lower abdominal pain onset earlier today. Pt reports onset of symptoms at 1600 before a soccer game. States improvement of pain during the game, but reports the pain has returned and since worsened after the game. Also endorses associated symptoms of nausea, but denies vomiting. Pt reports hx of Covid-19 1 year ago and denies hx of previous similar symptoms or hx of abdominal surgeries. He reports his last normal bowel movement at 1600 today without diarrhea, constipation, or blood in stool. Pt denies tobacco, ETOH, or other illicit drug use. Denies vomiting, other somatic symptoms, and pain elsewhere. No other modifying or alleviating factors.      ED Course:  Workup significant for mildly elevated lipase, but not 3 times the upper limit.  X-ray with normal bowel gas pattern.  CK elevated at 2430. Patient reports improvement in symptoms after IV fluids and 0.5 mg of Dilaudid.     Plan for EDOU observation with IV fluids, repeat labs in the morning, and serial abdominal exams.    Observation Course:    Overall improvement pain.  No recurrence of abdominal pain    Consultants:    None    Final Diagnosis:  1. Acute abdominal pain    2. Non-traumatic rhabdomyolysis      1. Acute abdominal pain-resolved prior to arrival no recurrence.  Patient reports that he is following with his doctor for possible GERD.  Lipase is mildly elevated could be related to medication verses some ketosis.  No focal tenderness on exam.  Plan to send outpatient referral.  Will also DC omeprazole as  potential contributing agent to elevated lipase    2. Nontraumatic rhabdomyolysis-CPK improving.  Overall pain improving.  Tolerating oral liquids.  Plan to DC home with strict instructions on no  exertional or continued physical activity for the next several days.      Discharge Condition: Good    Disposition: Home or Self Care     Time spent on the discharge of the patient including review of hospital course with the patient. reviewing discharge medications and arranging follow-up care 35 minutes.  Patient was seen and examined on the date of discharge and determined to be suitable for discharge.    Follow Up:  Will place GI referral.  Will also place outpatient case management referral as some mental health barriers to care